# Patient Record
Sex: MALE | Race: WHITE | NOT HISPANIC OR LATINO | Employment: OTHER | URBAN - METROPOLITAN AREA
[De-identification: names, ages, dates, MRNs, and addresses within clinical notes are randomized per-mention and may not be internally consistent; named-entity substitution may affect disease eponyms.]

---

## 2020-08-18 ENCOUNTER — TRANSCRIBE ORDERS (OUTPATIENT)
Dept: ADMINISTRATIVE | Facility: HOSPITAL | Age: 65
End: 2020-08-18

## 2020-08-18 DIAGNOSIS — R94.31 NONSPECIFIC ABNORMAL ELECTROCARDIOGRAM (ECG) (EKG): Primary | ICD-10-CM

## 2020-08-25 ENCOUNTER — HOSPITAL ENCOUNTER (OUTPATIENT)
Dept: NON INVASIVE DIAGNOSTICS | Facility: HOSPITAL | Age: 65
Discharge: HOME | End: 2020-08-25
Payer: COMMERCIAL

## 2020-08-25 DIAGNOSIS — R94.31 NONSPECIFIC ABNORMAL ELECTROCARDIOGRAM (ECG) (EKG): ICD-10-CM

## 2020-08-25 PROCEDURE — 93306 TTE W/DOPPLER COMPLETE: CPT | Performed by: INTERNAL MEDICINE

## 2020-08-25 PROCEDURE — 93306 TTE W/DOPPLER COMPLETE: CPT

## 2020-09-17 ENCOUNTER — OFFICE VISIT (OUTPATIENT)
Dept: BARIATRICS | Facility: CLINIC | Age: 65
End: 2020-09-17
Payer: COMMERCIAL

## 2020-09-17 VITALS
HEIGHT: 73 IN | BODY MASS INDEX: 33.88 KG/M2 | RESPIRATION RATE: 16 BRPM | SYSTOLIC BLOOD PRESSURE: 138 MMHG | WEIGHT: 255.6 LBS | DIASTOLIC BLOOD PRESSURE: 78 MMHG | HEART RATE: 89 BPM | TEMPERATURE: 98.6 F

## 2020-09-17 DIAGNOSIS — E66.9 DIABETES MELLITUS TYPE 2 IN OBESE (HCC): ICD-10-CM

## 2020-09-17 DIAGNOSIS — E11.69 DIABETES MELLITUS TYPE 2 IN OBESE (HCC): ICD-10-CM

## 2020-09-17 DIAGNOSIS — I10 ESSENTIAL HYPERTENSION: ICD-10-CM

## 2020-09-17 DIAGNOSIS — E66.9 CLASS 1 OBESITY: ICD-10-CM

## 2020-09-17 DIAGNOSIS — R63.5 ABNORMAL WEIGHT GAIN: Primary | ICD-10-CM

## 2020-09-17 PROBLEM — R35.1 BENIGN PROSTATIC HYPERPLASIA WITH NOCTURIA: Status: ACTIVE | Noted: 2018-02-07

## 2020-09-17 PROBLEM — N40.1 BENIGN PROSTATIC HYPERPLASIA WITH NOCTURIA: Status: ACTIVE | Noted: 2018-02-07

## 2020-09-17 PROBLEM — E78.5 HYPERLIPIDEMIA: Status: ACTIVE | Noted: 2020-09-17

## 2020-09-17 PROCEDURE — 99204 OFFICE O/P NEW MOD 45 MIN: CPT | Performed by: PHYSICIAN ASSISTANT

## 2020-09-17 RX ORDER — AMLODIPINE BESYLATE 10 MG/1
10 TABLET ORAL DAILY
COMMUNITY
Start: 2020-08-14

## 2020-09-17 RX ORDER — HYDROCHLOROTHIAZIDE 12.5 MG/1
TABLET ORAL
COMMUNITY
Start: 2020-07-27

## 2020-09-17 RX ORDER — LISINOPRIL 10 MG/1
10 TABLET ORAL DAILY
COMMUNITY
Start: 2020-08-19

## 2020-09-17 RX ORDER — ROSUVASTATIN CALCIUM 20 MG/1
20 TABLET, COATED ORAL EVERY EVENING
COMMUNITY
Start: 2020-07-27

## 2020-09-17 NOTE — ASSESSMENT & PLAN NOTE
-Discussed options of HealthyCORE-Intensive Lifestyle Intervention Program, Very Low Calorie Diet-VLCD and Conservative Program and the role of weight loss medications   -Initial weight loss goal of 5-10% weight loss for improved health  -Screening labs: reviewed  -Patient is interested in pursuing VLCD    +STOP BANG (4/8):  -reviewed risks of undiagnosed/untreated sleep apnea  -Recommended referral to sleep medicine provider for further evaluation   -Patient declined  Patient reports he has no interest in talking about KAY and will walk out of office if it is talked about

## 2020-09-17 NOTE — PATIENT INSTRUCTIONS
Goals: Food log (ie ) www myfitnesspal com,sparkpeople  com,loseit com,calorieking  com,etc    No sugary beverages  At least 80oz of water daily    Monitor blood pressure daily to twice a day; if consistently less than 100/60 or feeling lightheaded or dizzy please notify office or family doctor

## 2020-09-17 NOTE — PROGRESS NOTES
Assessment/Plan:    Class 1 obesity  -Discussed options of HealthyCORE-Intensive Lifestyle Intervention Program, Very Low Calorie Diet-VLCD and Conservative Program and the role of weight loss medications   -Initial weight loss goal of 5-10% weight loss for improved health  -Screening labs: reviewed  -Patient is interested in pursuing VLCD    +STOP BANG (4/8):  -reviewed risks of undiagnosed/untreated sleep apnea  -Recommended referral to sleep medicine provider for further evaluation   -Patient declined  Patient reports he has no interest in talking about KAY and will walk out of office if it is talked about  Diabetes mellitus type 2 in obese [facility])    Lab Results   Component Value Date    HGBA1C 6 8 (H) 08/14/2020   -currently managed through diet alone  -will improve with weight loss and dietary changes    Essential hypertension  -On Norvasc, Lisinopril and HCTZ  -likely to improve with weight loss    Hyperlipidemia  -on Crestor  -may improve with weight loss and dietary changes    Goals:    Food log (ie ) www myfitnesspal com,sparkpeople  com,loseit com,calorieking  com,etc    No sugary beverages  At least 80oz of water daily  Monitor blood pressure; if consistently less than 100/60 or feeling lightheaded or dizzy please notify office or family doctor    Follow up in approximately 1 week with Non-Surgical Dietician  Diagnoses and all orders for this visit:    Abnormal weight gain  Comments:  see plan under class 1 obesity    Class 1 obesity    Diabetes mellitus type 2 in obese [facility])    Essential hypertension    Other orders  -     amLODIPine (NORVASC) 10 mg tablet; Take 10 mg by mouth daily  -     hydrochlorothiazide (HYDRODIURIL) 12 5 mg tablet; TAKE 1 TABLET BY MOUTH EVERY DAY  -     lisinopril (ZESTRIL) 10 mg tablet; Take 10 mg by mouth daily  -     rosuvastatin (CRESTOR) 20 MG tablet;  Take 20 mg by mouth every evening          Subjective:   Chief Complaint   Patient presents with    Consult     Patient is here for MWM consult  Taisha Jane 4/8  Patient ID: Paolo Saldivar  is a 63 y o  male with excess weight/obesity here to pursue weight managment  Past Medical History:   Diagnosis Date    Hypertension          HPI:  Obesity/Excess Weight:  Severity: Mild  Onset:  Age 50 , worsened since [facility]  Modifiers: reports no prior attempts  Contributing factors: sweets, grazing  Associated symptoms: increased joint pain and high blood pressure, elevated cholsterol    Goals: 205 lbs  Highest 255 lbs    Hydration: water 1 gallon, 1 can of soda  Exercise: walks 2 miles per day or 5 mile bike ride if didn't walk  Retired    Colonoscopy: reports cologuard 2019    The following portions of the patient's history were reviewed and updated as appropriate: allergies, current medications, past family history, past medical history, past social history, past surgical history and problem list     Review of Systems   Constitutional: Negative for chills and fever  HENT: Negative for sore throat  Respiratory: Negative for cough and shortness of breath  Cardiovascular: Negative for chest pain and palpitations  Gastrointestinal: Negative for abdominal pain, constipation, diarrhea, nausea and vomiting  Genitourinary: Negative for dysuria  Musculoskeletal: Positive for arthralgias (knees and shoulders)  Skin: Negative for rash  Neurological: Negative for dizziness and headaches  Psychiatric/Behavioral: The patient is not nervous/anxious  Denies depression       Objective:    /78 (BP Location: Right arm, Patient Position: Sitting, Cuff Size: Large)   Pulse 89   Temp 98 6 °F (37 °C)   Resp 16   Ht 6' 1" (1 854 m)   Wt 116 kg (255 lb 9 6 oz)   BMI 33 72 kg/m²     Physical Exam  Vitals signs and nursing note reviewed  Constitutional   General appearance: Abnormal   well developed and obese  Eyes No conjunctival pallor     Ears, Nose, Mouth, and Throat Oral mucosa moist    Pulmonary Respiratory effort: No increased work of breathing or signs of respiratory distress  Auscultation of lungs: Clear to auscultation, equal breath sounds bilaterally, no wheezes, no rales, no rhonci  Cardiovascular   Auscultation of heart: Normal rate and rhythm, normal S1 and S2, without murmurs  Examination of extremities for edema and/or varicosities: Normal   no edema  Abdomen   Abdomen: Abnormal   The abdomen was obese  Bowel sounds were normal  The abdomen was soft and nontender     Musculoskeletal   Gait and station: Normal     Psychiatric   Orientation to person, place and time: Normal     Affect: appropriate

## 2020-09-17 NOTE — ASSESSMENT & PLAN NOTE
Lab Results   Component Value Date    HGBA1C 6 8 (H) 08/14/2020   -currently managed through diet alone  -will improve with weight loss and dietary changes

## 2020-09-24 ENCOUNTER — OFFICE VISIT (OUTPATIENT)
Dept: BARIATRICS | Facility: CLINIC | Age: 65
End: 2020-09-24

## 2020-09-24 VITALS — BODY MASS INDEX: 33.77 KG/M2 | TEMPERATURE: 98.7 F | WEIGHT: 254.8 LBS | HEIGHT: 73 IN

## 2020-09-24 DIAGNOSIS — R63.5 ABNORMAL WEIGHT GAIN: ICD-10-CM

## 2020-09-24 PROCEDURE — VLCD

## 2020-09-24 PROCEDURE — RECHECK

## 2020-10-15 ENCOUNTER — OFFICE VISIT (OUTPATIENT)
Dept: BARIATRICS | Facility: CLINIC | Age: 65
End: 2020-10-15

## 2020-10-15 VITALS — BODY MASS INDEX: 32.15 KG/M2 | WEIGHT: 242.6 LBS | HEIGHT: 73 IN | TEMPERATURE: 97.5 F

## 2020-10-15 DIAGNOSIS — R63.5 ABNORMAL WEIGHT GAIN: Primary | ICD-10-CM

## 2020-10-15 PROCEDURE — VLCD

## 2020-10-15 PROCEDURE — RECHECK

## 2020-10-29 ENCOUNTER — OFFICE VISIT (OUTPATIENT)
Dept: BARIATRICS | Facility: CLINIC | Age: 65
End: 2020-10-29

## 2020-10-29 VITALS
SYSTOLIC BLOOD PRESSURE: 126 MMHG | BODY MASS INDEX: 31.73 KG/M2 | TEMPERATURE: 98.1 F | HEIGHT: 73 IN | DIASTOLIC BLOOD PRESSURE: 82 MMHG | WEIGHT: 239.4 LBS

## 2020-10-29 DIAGNOSIS — R63.5 ABNORMAL WEIGHT GAIN: ICD-10-CM

## 2020-10-29 PROCEDURE — VLCD

## 2020-10-29 PROCEDURE — RECHECK

## 2020-11-12 ENCOUNTER — OFFICE VISIT (OUTPATIENT)
Dept: BARIATRICS | Facility: CLINIC | Age: 65
End: 2020-11-12

## 2020-11-12 VITALS
BODY MASS INDEX: 30.77 KG/M2 | TEMPERATURE: 98.3 F | HEIGHT: 73 IN | DIASTOLIC BLOOD PRESSURE: 84 MMHG | SYSTOLIC BLOOD PRESSURE: 126 MMHG | WEIGHT: 232.2 LBS

## 2020-11-12 DIAGNOSIS — R63.5 ABNORMAL WEIGHT GAIN: Primary | ICD-10-CM

## 2020-11-12 PROCEDURE — RECHECK

## 2020-11-12 PROCEDURE — VLCD

## 2020-12-01 ENCOUNTER — LAB (OUTPATIENT)
Dept: LAB | Facility: CLINIC | Age: 65
End: 2020-12-01
Payer: COMMERCIAL

## 2020-12-01 DIAGNOSIS — R63.5 ABNORMAL WEIGHT GAIN: ICD-10-CM

## 2020-12-01 LAB
ANION GAP SERPL CALCULATED.3IONS-SCNC: 5 MMOL/L (ref 4–13)
BUN SERPL-MCNC: 19 MG/DL (ref 5–25)
CALCIUM SERPL-MCNC: 9.8 MG/DL (ref 8.3–10.1)
CHLORIDE SERPL-SCNC: 104 MMOL/L (ref 100–108)
CO2 SERPL-SCNC: 29 MMOL/L (ref 21–32)
CREAT SERPL-MCNC: 1.01 MG/DL (ref 0.6–1.3)
GFR SERPL CREATININE-BSD FRML MDRD: 79 ML/MIN/1.73SQ M
GLUCOSE P FAST SERPL-MCNC: 97 MG/DL (ref 65–99)
MAGNESIUM SERPL-MCNC: 2.6 MG/DL (ref 1.6–2.6)
POTASSIUM SERPL-SCNC: 4.1 MMOL/L (ref 3.5–5.3)
SODIUM SERPL-SCNC: 138 MMOL/L (ref 136–145)

## 2020-12-01 PROCEDURE — 80048 BASIC METABOLIC PNL TOTAL CA: CPT

## 2020-12-01 PROCEDURE — 36415 COLL VENOUS BLD VENIPUNCTURE: CPT

## 2020-12-01 PROCEDURE — 83735 ASSAY OF MAGNESIUM: CPT

## 2020-12-03 ENCOUNTER — OFFICE VISIT (OUTPATIENT)
Dept: BARIATRICS | Facility: CLINIC | Age: 65
End: 2020-12-03

## 2020-12-03 VITALS — BODY MASS INDEX: 30.46 KG/M2 | WEIGHT: 229.8 LBS | HEIGHT: 73 IN

## 2020-12-03 DIAGNOSIS — R63.5 ABNORMAL WEIGHT GAIN: ICD-10-CM

## 2020-12-03 PROCEDURE — RECHECK

## 2020-12-03 PROCEDURE — VLCD

## 2020-12-17 ENCOUNTER — TELEPHONE (OUTPATIENT)
Dept: BARIATRICS | Facility: CLINIC | Age: 65
End: 2020-12-17

## 2021-03-10 DIAGNOSIS — Z23 ENCOUNTER FOR IMMUNIZATION: ICD-10-CM

## 2021-08-06 ENCOUNTER — CONSULT (OUTPATIENT)
Dept: PAIN MEDICINE | Facility: CLINIC | Age: 66
End: 2021-08-06
Payer: COMMERCIAL

## 2021-08-06 VITALS
WEIGHT: 225.8 LBS | DIASTOLIC BLOOD PRESSURE: 90 MMHG | HEIGHT: 73 IN | HEART RATE: 87 BPM | TEMPERATURE: 99.4 F | BODY MASS INDEX: 29.93 KG/M2 | SYSTOLIC BLOOD PRESSURE: 143 MMHG

## 2021-08-06 DIAGNOSIS — M54.12 CERVICAL RADICULOPATHY: Primary | ICD-10-CM

## 2021-08-06 DIAGNOSIS — M54.2 NECK PAIN: ICD-10-CM

## 2021-08-06 DIAGNOSIS — M79.18 MYOFASCIAL PAIN SYNDROME: ICD-10-CM

## 2021-08-06 PROCEDURE — 99244 OFF/OP CNSLTJ NEW/EST MOD 40: CPT | Performed by: ANESTHESIOLOGY

## 2021-08-06 RX ORDER — DULOXETIN HYDROCHLORIDE 20 MG/1
20 CAPSULE, DELAYED RELEASE ORAL DAILY
Qty: 30 CAPSULE | Refills: 1 | Status: SHIPPED | OUTPATIENT
Start: 2021-08-06 | End: 2021-09-20

## 2021-08-06 RX ORDER — TRAMADOL HYDROCHLORIDE 50 MG/1
TABLET ORAL
COMMUNITY
Start: 2021-07-29

## 2021-08-06 NOTE — PATIENT INSTRUCTIONS
Neck Exercises   WHAT YOU NEED TO KNOW:   Why is it important to do neck exercises? Neck exercises help reduce neck pain, and improve neck movement and strength  Neck exercises also help prevent long-term neck problems  What do I need to know about neck exercises? · Do the exercises every day,  or as often as directed by your healthcare provider  · Move slowly, gently, and smoothly  Avoid fast or jerky motions  · Stand and sit the way your healthcare provider shows you  Good posture may reduce your neck pain  Check your posture often, even when you are not doing your neck exercises  How do I perform neck exercises safely? · Exercise position:  You may sit or stand while you do neck exercises  Face forward  Your shoulders should be straight and relaxed, with a good posture  · Head tilts, forward and back:  Gently bow your head and try to touch your chin to your chest  Your healthcare provider may tell you to push on the back of your neck to help bow your head  Raise your chin back to the starting position  Tilt your head back as far as possible so you are looking up at the ceiling  Your healthcare provider may tell you to lift your chin to help tilt your head back  Return your head to the starting position  · Head tilts, side to side:  Tilt your head, bringing your ear toward your shoulder  Then tilt your head toward the other shoulder  · Head turns:  Turn your head to look over your shoulder  Tilt your chin down and try to touch it to your shoulder  Do not raise your shoulder to your chin  Face forward again  Do the same on the other side  · Head rolls:  Slowly bring your chin toward your chest  Next, roll your head to the right  Your ear should be positioned over your shoulder  Hold this position for 5 seconds  Roll your head back toward your chest and to the left into the same position  Hold for 5 seconds   Gently roll your head back and around in a clockwise [place] 3 times  Next, move your head in the reverse direction (counterclockwise) in a [place] 3 times  Do not shrug your shoulders upwards while you do this exercise  When should I contact my healthcare provider? · Your pain does not get better, or gets worse  · You have questions or concerns about your condition, care, or exercise program     CARE AGREEMENT:   You have the right to help plan your care  Learn about your health condition and how it may be treated  Discuss treatment options with your healthcare providers to decide what care you want to receive  You always have the right to refuse treatment  The above information is an  only  It is not intended as medical advice for individual conditions or treatments  Talk to your doctor, nurse or pharmacist before following any medical regimen to see if it is safe and effective for you  © Copyright [employer] 2021 Information is for End User's use only and may not be sold, redistributed or otherwise used for commercial purposes   All illustrations and images included in CareNotes® are the copyrighted property of A D A M , Inc  or [address] [place] [address]

## 2021-08-06 NOTE — PROGRESS NOTES
Assessment:  1  Cervical radiculopathy    2  Neck pain    3  Myofascial pain syndrome        Plan:  Patient is a 73-year-old male complaints of neck pain and right shoulder pain presents office for initial consultation  1  We will consider an x-ray of the cervical spine with flexion extension  2  We will order physical therapy for cervical spine strengthening exercises  Once completed will graduate to [facility] center  3  We will schedule patient for a right ultrasound-guided paracervical and trapezius trigger point injection with steroid  4  We will trial Cymbalta 20 mg p o  q h s  for lumbar radicular symptoms  5  Follow-up in 6 weeks     Complete risks and benefits including bleeding, infection, tissue reaction, nerve injury and allergic reaction were discussed  The approach was demonstrated using models and literature was provided  Verbal and written consent was obtained  History of Present Illness: The patient is a 63 y o  male who presents for consultation in regards to Shoulder Pain and Neck Pain  Symptoms have been present for 1 month  Symptoms began without any precipitating injury or trauma  Pain is reported to be 7 on the numeric rating scale  Symptoms are felt nearly constantly and worst in the no typical pattern  Symptoms are characterized as burning, cramping, sharp, dull/aching, cutting, numbing, tingling, pressure-like and throbbing  Symptoms are associated with right arm weakness  Aggravating factors include lying down and sitting  Relieving factors include nothing  No change in symptoms with kneeling, standing, bending, leaning forward, leaning bckward, walking, relaxation, coughing/sneezing and bowel movements  Patient has not attempted modalities at this time     Medications to relieve symptoms include Tylenol, Motrin, Mobic, Robaxin, gabapentin without any significant alleviation of symptoms      Review of Systems:    Review of Systems   Constitutional: Negative for fever and unexpected weight change  HENT: Negative for trouble swallowing  Eyes: Negative for visual disturbance  Respiratory: Negative for shortness of breath and wheezing  Cardiovascular: Negative for chest pain and palpitations  Gastrointestinal: Negative for constipation, diarrhea, nausea and vomiting  Endocrine: Negative for cold intolerance, heat intolerance and polydipsia  Genitourinary: Negative for difficulty urinating and frequency  Musculoskeletal: Positive for arthralgias, joint swelling and myalgias  Negative for gait problem  Skin: Negative for rash  Neurological: Positive for numbness  Negative for dizziness, seizures, syncope, weakness and headaches  Hematological: Does not bruise/bleed easily  Psychiatric/Behavioral: Negative for dysphoric mood  All other systems reviewed and are negative  Past Medical History:   Diagnosis Date    Hypertension        Past Surgical History:   Procedure Laterality Date    CHOLECYSTECTOMY      SINUS SURGERY         Family History   Problem Relation Age of Onset    Hypertension Father     Heart attack Father     Liver cancer Brother     Diabetes Neg Hx     Stroke Neg Hx        Social History     Occupational History    Not on file   Tobacco Use    Smoking status: Former Smoker    Smokeless tobacco: Never Used    Tobacco comment: Quit 35 years ago     Vaping Use    Vaping Use: Never used   Substance and Sexual Activity    Alcohol use: Not Currently    Drug use: Never    Sexual activity: Not on file         Current Outpatient Medications:     amLODIPine (NORVASC) 10 mg tablet, Take 10 mg by mouth daily, Disp: , Rfl:     hydrochlorothiazide (HYDRODIURIL) 12 5 mg tablet, TAKE 1 TABLET BY MOUTH EVERY DAY, Disp: , Rfl:     lisinopril (ZESTRIL) 10 mg tablet, Take 10 mg by mouth daily, Disp: , Rfl:     rosuvastatin (CRESTOR) 20 MG tablet, Take 20 mg by mouth every evening, Disp: , Rfl:     No Known Allergies    Physical Exam:    /90 (BP Location: Right arm, Patient Position: Sitting, Cuff Size: Standard)   Pulse 87   Temp 99 4 °F (37 4 °C)   Ht 6' 1" (1 854 m)   Wt 102 kg (225 lb 12 8 oz)   BMI 29 79 kg/m²     Constitutional: normal, well developed, well nourished, alert, in no distress and non-toxic and no overt pain behavior  Eyes: anicteric  HEENT: grossly intact  Neck: supple, symmetric, trachea midline and no masses   Pulmonary:even and unlabored  Cardiovascular:No edema or pitting edema present  Skin:Normal without rashes or lesions and well hydrated  Psychiatric:Mood and affect appropriate  Neurologic:Cranial Nerves II-XII grossly intact  Musculoskeletal:normal     Cervical Spine examination demonstrates  Decreased ROM secondary to pain with lateral rotation to the left/right and bending to the left/right, in addition to neck flexion  5/5 upper extremity strength in all muscle groups bilaterally  Negative Spurling's maneuver to the b/l Ue, sensitivity to light touch intact b/l Ue  Imaging  No orders to display       No orders of the defined types were placed in this encounter

## 2021-08-17 ENCOUNTER — EVALUATION (OUTPATIENT)
Dept: PHYSICAL THERAPY | Facility: CLINIC | Age: 66
End: 2021-08-17
Payer: COMMERCIAL

## 2021-08-17 DIAGNOSIS — M54.12 CERVICAL RADICULOPATHY: ICD-10-CM

## 2021-08-17 DIAGNOSIS — M79.18 MYOFASCIAL PAIN SYNDROME: ICD-10-CM

## 2021-08-17 DIAGNOSIS — M54.2 NECK PAIN: ICD-10-CM

## 2021-08-17 PROCEDURE — 97162 PT EVAL MOD COMPLEX 30 MIN: CPT | Performed by: PHYSICAL THERAPIST

## 2021-08-17 PROCEDURE — 97140 MANUAL THERAPY 1/> REGIONS: CPT | Performed by: PHYSICAL THERAPIST

## 2021-08-17 NOTE — PROGRESS NOTES
PT Evaluation      Today's date: 2021  Patient name: Salo Hernandez  : 1955  MRN: [id number]  Referring provider: Flako Frost MD  Dx:   Encounter Diagnosis     ICD-10-CM    1  Cervical radiculopathy  M54 12 Ambulatory referral to Physical Therapy   2  Neck pain  M54 2 Ambulatory referral to Physical Therapy   3  Myofascial pain syndrome  M79 18 Ambulatory referral to Physical Therapy                  Assessment  Assessment details: Salo Hernandez is a 63 y o  male presenting to outpatient physical therapy with diagnosis of cervical pain and radiculopathy  Patient's current impairments include pain, impaired soft tissue mobility, reduced range of motion, reduced strength, reduced postural awareness, and reduced activity tolerance  Patient's present functional limitations include difficulty with ADLs with increased need for assistance, reliance on medication and/or modalities for pain relief, poor tolerance for functional mobility and activity, and difficulty completing work/school responsibilities  Patient to benefit from skilled outpatient physical therapy 2x/week for 4 weeks in order to reduce pain, maximize pain free range of motion, increase strength and stability, and improve functional mobility/functional activity in order to maximize return to prior level of function with reduced limitations  Thank you for your referral     Impairments: abnormal gait, abnormal muscle firing, abnormal or restricted ROM, abnormal movement, activity intolerance, impaired physical strength, lacks appropriate home exercise program, pain with function and poor posture   Understanding of Dx/Px/POC: good   Prognosis: good  Prognosis details: Positive prognostic indicators include positive attitude toward recovery and good understanding of diagnosis and treatment plan options  Negative prognostic indicators include chronicity of symptoms and comorbidities  Goals  STGs to be achieved in 4 weeks:  1   Pt to demonstrate reduced subjective pain rating "at worst" by at least 2-3 points from Initial Eval in order to allow for reduced pain with ADLs and improved functional activity tolerance  2  Pt to demonstrate increased AROM of cervical spine by at least 5-10 degrees in order to allow for greater ease and independence with ADLs and functional mobility  3  Pt to demonstrate full PROM of cervical spine in order to maximize joint mobility and function and allow for progression of exercise program and achievement of goals  4  Pt to demonstrate increased MMT of bilateral UEs by at least 1/2-1 grade in order to improve safety and stability with ADLs and functional mobility  LTGs to be achieved in 6-8 weeks:  1  Pt will be I with HEP in order to continue to improve quality of life and independence and reduce risk for re-injury  2  Pt to demonstrate return to all overhead activity without limitations or restrictions  3  Pt to demonstrate improved function as noted by achieving or exceeding predicted score on FOTO outcomes assessment tool  Plan  Patient would benefit from: skilled physical therapy  Other planned modality interventions: Modalties prn as needed  Planned therapy interventions: manual therapy, neuromuscular re-education, therapeutic exercise, therapeutic activities, gait training, home exercise program and Mukherjee taping  Other planned therapy interventions: [place] Neck  Frequency: 2x week  Duration in visits: 8  Duration in weeks: 4  Plan of Care beginning date: 8/17/2021  Plan of Care expiration date: 9/16/2021  Treatment plan discussed with: patient and PTA        Subjective Evaluation    History of Present Illness  Mechanism of injury: Patient presents with neck pain starting about 6 months ago  Pt thought it was his shoulder, now progressed from shoulder (left) to neck and mid-scap region  Patient reports he has numbness/tingling into left deltoid area to include left scap    He started using a soft neck collar for sleeping  Was started on some meds, seemed to help, but was cx in Epic by PCP accidentally  Reports that gabapentin was helping  Notes recently rehabed his house for the last 6 months  Looking up is bothersome, sidebending pinches, turning head to left results in pain  All of these movements result in instant radicular pains  Sleeping approximately 3 hours solid sleep, otherwise toss and turn  Occasional headaches  Per EMR:    Patient is a 28-year-old male complaints of neck pain and right shoulder pain presents office for initial consultation      1  We will consider an x-ray of the cervical spine with flexion extension  2  We will order physical therapy for cervical spine strengthening exercises  Once completed will graduate to [facility]  3  We will schedule patient for a right ultrasound-guided paracervical and trapezius trigger point injection with steroid  4  We will trial Cymbalta 20 mg p o  q h s  for lumbar radicular symptoms  5  Follow-up in 6 weeks           Not a recurrent problem   Quality of life: fair    Pain  Current pain ratin  At best pain ratin  At worst pain ratin  Location: Cervical spine with radicular symptoms into left UE  Quality: needle-like and burning  Relieving factors: rest, support and medications  Aggravating factors: overhead activity  Progression: worsening    Social Support    Employment status: working  Hand dominance: right    Treatments  Previous treatment: medication  Current treatment: physical therapy  Patient Goals  Patient goals for therapy: decreased pain, increased motion, independence with ADLs/IADLs, increased strength and return to sport/leisure activities          Objective     Concurrent Complaints  Positive for night pain and disturbed sleep   Negative for dizziness, faints, trouble swallowing and visual change    Postural Observations  Seated posture: fair  Standing posture: fair        Tenderness Additional Tenderness Details  TTP through UT  TTP through levator  TTP through anterior scalenes  Restrictions noted in left anterior triangle  Decreased scap mobility left  Hypomobility thoracic spine    Active Range of Motion   Cervical/Thoracic Spine       Cervical    Flexion:  WFL  Extension: 15 degrees      Left lateral flexion: 28 degrees      Right lateral flexion: 35 degrees      Left rotation:  Restriction level: moderate  Right rotation:  Restriction level: moderate    General Comments:      Shoulder Comments   Left deltoid atrophy vs  Right  Left GHJ flexion 4/5  Left GHJ Abd 4/5  Left ER 4-/5  Left IR 4/5  Left Extn 4+/5    Elbow Comments   Grossly 4/5 left; 4+/5 right    Wrist/Hand Comments  Grossly 4+/5 billaterally             Precautions: Radicular symptoms, left GHJ atrophy  Re-eval Date: 9/16/2021    Date 8/17       Visit Count 1       FOTO See IE       Pain In See IE       Pain Out See IE                 Manuals        MObs Grade II-IV thoracic mobs  10 mins  Prone  Pillow abdomen and "C" pillow                               Neuro Re-Ed                                                                Ther Ex        Aerobic        UT stretch        Nods        MTP/LTP        DNF endurance                                Ther Activity                        Gait Training                        Modalities

## 2021-08-18 ENCOUNTER — TELEPHONE (OUTPATIENT)
Dept: PAIN MEDICINE | Facility: CLINIC | Age: 66
End: 2021-08-18

## 2021-08-18 DIAGNOSIS — M54.12 CERVICAL RADICULOPATHY: Primary | ICD-10-CM

## 2021-08-18 RX ORDER — GABAPENTIN 100 MG/1
100 CAPSULE ORAL 3 TIMES DAILY
Qty: 60 CAPSULE | Refills: 1 | Status: SHIPPED | OUTPATIENT
Start: 2021-08-18 | End: 2021-09-20

## 2021-08-18 NOTE — TELEPHONE ENCOUNTER
S/w pt to clarify message below  Pt said RM prescribed Duloxetine but he decided to stop it because it was not helping  RN educated pt that it can take 3-4 weeks to see the effects from the medication, pt said he would like to stay off of it  Pt said he s/w his PCP to refill his Gabapentin, but they told him he will need to have [facility] take over the prescription  Pt said he is taking 100 mg BID with relief of his symptoms and no s/e  Pt requesting a RF to be sent to [facility] on file

## 2021-08-18 NOTE — PROGRESS NOTES
PT Evaluation      Today's date: 2021  Patient name: Demi Richter  : 1955  MRN: [id number]  Referring provider: China Heart MD  Dx:   Encounter Diagnosis     ICD-10-CM    1  Cervical radiculopathy  M54 12 Ambulatory referral to Physical Therapy   2  Neck pain  M54 2 Ambulatory referral to Physical Therapy   3  Myofascial pain syndrome  M79 18 Ambulatory referral to Physical Therapy                  Assessment  Assessment details: Demi Richter is a 63 y o  male presenting to outpatient physical therapy with diagnosis of cervical pain and radiculopathy  Patient's current impairments include pain, impaired soft tissue mobility, reduced range of motion, reduced strength, reduced postural awareness, and reduced activity tolerance  Patient's present functional limitations include difficulty with ADLs with increased need for assistance, reliance on medication and/or modalities for pain relief, poor tolerance for functional mobility and activity, and difficulty completing work/school responsibilities  Patient to benefit from skilled outpatient physical therapy 2x/week for 4 weeks in order to reduce pain, maximize pain free range of motion, increase strength and stability, and improve functional mobility/functional activity in order to maximize return to prior level of function with reduced limitations  Thank you for your referral     Impairments: abnormal gait, abnormal muscle firing, abnormal or restricted ROM, abnormal movement, activity intolerance, impaired physical strength, lacks appropriate home exercise program, pain with function and poor posture   Understanding of Dx/Px/POC: good   Prognosis: good  Prognosis details: Positive prognostic indicators include positive attitude toward recovery and good understanding of diagnosis and treatment plan options  Negative prognostic indicators include chronicity of symptoms and comorbidities  Goals  STGs to be achieved in 4 weeks:  1   Pt to demonstrate reduced subjective pain rating "at worst" by at least 2-3 points from Initial Eval in order to allow for reduced pain with ADLs and improved functional activity tolerance  2  Pt to demonstrate increased AROM of cervical spine by at least 5-10 degrees in order to allow for greater ease and independence with ADLs and functional mobility  3  Pt to demonstrate full PROM of cervical spine in order to maximize joint mobility and function and allow for progression of exercise program and achievement of goals  4  Pt to demonstrate increased MMT of bilateral UEs by at least 1/2-1 grade in order to improve safety and stability with ADLs and functional mobility  LTGs to be achieved in 6-8 weeks:  1  Pt will be I with HEP in order to continue to improve quality of life and independence and reduce risk for re-injury  2  Pt to demonstrate return to all overhead activity without limitations or restrictions  3  Pt to demonstrate improved function as noted by achieving or exceeding predicted score on FOTO outcomes assessment tool  Plan  Patient would benefit from: skilled physical therapy  Other planned modality interventions: Modalties prn as needed  Planned therapy interventions: manual therapy, neuromuscular re-education, therapeutic exercise, therapeutic activities, gait training, home exercise program and Mukherjee taping  Other planned therapy interventions: Iron Neck  Frequency: 2x week  Duration in visits: 8  Duration in weeks: 4  Plan of Care beginning date: 8/17/2021  Plan of Care expiration date: 9/16/2021  Treatment plan discussed with: patient and PTA        Subjective Evaluation    History of Present Illness  Mechanism of injury: Patient presents with neck pain starting about 6 months ago  Pt thought it was his shoulder, now progressed from shoulder (left) to neck and mid-scap region  Patient reports he has numbness/tingling into left deltoid area to include left scap    He started using a soft neck collar for sleeping  Was started on some meds, seemed to help, but was cx in Epic by PCP accidentally  Reports that gabapentin was helping  Notes recently rehabed his house for the last 6 months  Looking up is bothersome, sidebending pinches, turning head to left results in pain  All of these movements result in instant radicular pains  Sleeping approximately 3 hours solid sleep, otherwise toss and turn  Occasional headaches  Per EMR:    Patient is a 61-year-old male complaints of neck pain and right shoulder pain presents office for initial consultation      1  We will consider an x-ray of the cervical spine with flexion extension  2  We will order physical therapy for cervical spine strengthening exercises  Once completed will graduate to [facility]  3  We will schedule patient for a right ultrasound-guided paracervical and trapezius trigger point injection with steroid  4  We will trial Cymbalta 20 mg p o  q h s  for lumbar radicular symptoms  5  Follow-up in 6 weeks           Not a recurrent problem   Quality of life: fair    Pain  Current pain ratin  At best pain ratin  At worst pain ratin  Location: Cervical spine with radicular symptoms into left UE  Quality: needle-like and burning  Relieving factors: rest, support and medications  Aggravating factors: overhead activity  Progression: worsening    Social Support    Employment status: working  Hand dominance: right    Treatments  Previous treatment: medication  Current treatment: physical therapy  Patient Goals  Patient goals for therapy: decreased pain, increased motion, independence with ADLs/IADLs, increased strength and return to sport/leisure activities          Objective     Concurrent Complaints  Positive for night pain and disturbed sleep   Negative for dizziness, faints, headaches, nausea/motion sickness, tinnitus, trouble swallowing, difficulty breathing, visual change, history of cancer, history of trauma and infection    Postural Observations    Additional Postural Observation Details  Left v  Right upper arm atrophy, increased anterior tipping left scap      Tenderness     Additional Tenderness Details  TTP left UT  TTP left levator  Hypomobility of thoracic spine  Increased restrictions left anterior triangle  Decreased left scap mobility  TTP left anterior and medial scalenes  (+) radicular symptoms with left cervical rotation  Increased upper cervical extension at rest, increased kyphosis    Active Range of Motion   Cervical/Thoracic Spine       Cervical    Flexion:  WFL  Extension: 15 degrees      Left lateral flexion: 28 degrees      Right lateral flexion: 35 degrees      Left rotation:  Restriction level: moderate  Right rotation:  Restriction level: moderate    General Comments:      Shoulder Comments   Left deltoid atrophy vs  Right  Left GHJ flexion 4/5  Left GHJ Abd 4/5  Left ER 4-/5  Left IR 4/5  Left Extn 4+/5    Elbow Comments   Grossly 4/5 left; 4+/5 right    Wrist/Hand Comments  Grossly 4+/5 billaterally  Neuro Exam:     Headaches   Patient reports headaches: No               Precautions: Radicular symptoms, left GHJ atrophy  Re-eval Date: 9/16/2021    Date 8/17       Visit Count 1       FOTO See IE       Pain In See IE       Pain Out See IE                 Manuals        MObs Grade II-IV thoracic mobs  10 mins  Prone  Pillow abdomen and "C" pillow                               Neuro Re-Ed                                                                Ther Ex        Aerobic        UT stretch        Nods        MTP/LTP        DNF endurance                                Ther Activity                        Gait Training                        Modalities

## 2021-08-18 NOTE — TELEPHONE ENCOUNTER
Please see note below from PT  Patient is requesting gabapentin  We only saw him for consultation  The only medication that was prescribed was Cymbalta  Keep please call patient and clarify what he is looking for?

## 2021-08-18 NOTE — TELEPHONE ENCOUNTER
----- Message from Jordi Loomis sent at 8/18/2021 10:09 AM EDT -----  Patient was evaluated last night (Tuesday)  He reported Gabapentin was working well, but when he saw his PCP it was accidentally discontinued and the PCP won't re-order it because it was ordered by pain management originally  Not sure what that's all about, but he was wondering if he could get that re-ordered  I told him I would let you know  Thanks and Happy Wednesday!   Audra

## 2021-08-19 ENCOUNTER — OFFICE VISIT (OUTPATIENT)
Dept: PHYSICAL THERAPY | Facility: CLINIC | Age: 66
End: 2021-08-19
Payer: COMMERCIAL

## 2021-08-19 DIAGNOSIS — M79.18 MYOFASCIAL PAIN SYNDROME: ICD-10-CM

## 2021-08-19 DIAGNOSIS — M54.12 CERVICAL RADICULOPATHY: Primary | ICD-10-CM

## 2021-08-19 DIAGNOSIS — M54.2 NECK PAIN: ICD-10-CM

## 2021-08-19 PROCEDURE — 97112 NEUROMUSCULAR REEDUCATION: CPT | Performed by: PHYSICAL MEDICINE & REHABILITATION

## 2021-08-19 PROCEDURE — 97140 MANUAL THERAPY 1/> REGIONS: CPT | Performed by: PHYSICAL MEDICINE & REHABILITATION

## 2021-08-19 PROCEDURE — 97110 THERAPEUTIC EXERCISES: CPT | Performed by: PHYSICAL MEDICINE & REHABILITATION

## 2021-08-19 NOTE — PROGRESS NOTES
Daily Note     Today's date: 2021  Patient name: Jamison Nye  : 1955  MRN: [id number]  Referring provider: Michael Rucker MD  Dx:   Encounter Diagnosis     ICD-10-CM    1  Cervical radiculopathy  M54 12    2  Neck pain  M54 2    3  Myofascial pain syndrome  M79 18                   Subjective: Pt notes no new sx/complaints  C/c of pain L UT region into L shoulder  Intermittent n/t L arm/hand if he turns his neck a certain way  Eager to trial the Iron Neck today  Objective: See treatment diary below      Assessment: Tolerated treatment well  Initiated there ex program without incident this date  Tightness noted L UT/levator scap and suboccipital region  Limited OA flexion with manual tx and SOR; improved after SOR and STM techniques  Noted reduced L UT region pain after manual techniques  Pt with difficulty engaging LT and MTs with postural retraining/nabor scapular program; improves with feedback tactile and verbal; limited by reduced mm endurance and anterior tightness  Initiated Iron neck this date without incident; general mm fatigue noted; denied any increased pain/sx  Required frequent verbal and tactile cues to improve posture and DNF and posterior scapular mm engagement t/o use of iron neck; fair return demo; limited by fatigue  No complaints after tx  Patient demonstrated fatigue post treatment and would benefit from continued PT      Plan: Continue per plan of care  Progress treatment as tolerated         Precautions: Radicular symptoms, left GHJ atrophy  Re-eval Date: 2021    Date       Visit Count 1 2      FOTO See IE       Pain In See IE 7/10      Pain Out See IE 6/10                Manuals        MObs Grade II-IV thoracic mobs  10 mins  Prone  Pillow abdomen and "C" pillow SOR c-spine, gentle manual traction supine c-spine to tolerance, STM suboccipital region, L UT , and B c-spine PVMs   15' total                               Neuro Re-Ed          Iron neck Heavy cord  360* spin cw/ccw  3x ea     Look L /look R 5x 3ea 4 quadrants    Max tactile/verbal cues/feedback for postural awareness, mm activation, motor control/postural control t/o activity                                                       Ther Ex        Aerobic  UBE alt 10'   90 rpm      UT stretch  4x30" L    Levator stretch   4x30" L      Nods  Chin tucks supine  2x10/5"  Cues/feedback       MTP/LTP  Green   20x/5" ea  Cues/feedback      DNF endurance          B ER seated   [place]  Cues/feedback  2x10/5"                      Ther Activity                        Gait Training                        Modalities  deferred

## 2021-08-24 ENCOUNTER — OFFICE VISIT (OUTPATIENT)
Dept: PHYSICAL THERAPY | Facility: CLINIC | Age: 66
End: 2021-08-24
Payer: COMMERCIAL

## 2021-08-24 DIAGNOSIS — M54.12 CERVICAL RADICULOPATHY: Primary | ICD-10-CM

## 2021-08-24 DIAGNOSIS — M54.2 NECK PAIN: ICD-10-CM

## 2021-08-24 DIAGNOSIS — M79.18 MYOFASCIAL PAIN SYNDROME: ICD-10-CM

## 2021-08-24 PROCEDURE — 97110 THERAPEUTIC EXERCISES: CPT

## 2021-08-24 PROCEDURE — 97140 MANUAL THERAPY 1/> REGIONS: CPT

## 2021-08-24 PROCEDURE — 97112 NEUROMUSCULAR REEDUCATION: CPT

## 2021-08-24 NOTE — PROGRESS NOTES
Daily Note     Today's date: 2021  Patient name: Katelynn Buchanan  : 1955  MRN: [id number]  Referring provider: Delilah Rodriguez MD  Dx:   Encounter Diagnosis     ICD-10-CM    1  Cervical radiculopathy  M54 12    2  Neck pain  M54 2    3  Myofascial pain syndrome  M79 18        Start Time: 1415  Stop Time: 1510  Total time in clinic (min): 55 minutes    Subjective: "I am no different from last time I was here  If I turn my head to the L the pain shoots up to 9/10 "      Objective: See treatment diary below      Assessment: Tolerated treatment well  Able to increase reps on strengthening exercises without incident  Palpable tenderness noted L UT  Good tolerance of iron neck; slow controled movements with good posture; no increase in symptoms  No change in pain levels at end of session  Will continue to monitor and progress as able  Patient demonstrated fatigue post treatment and would benefit from continued PT      Plan: Continue per plan of care  Progress treatment as tolerated         Precautions: Radicular symptoms, left GHJ atrophy  Re-eval Date: 2021    Date      Visit Count 1 2 3     FOTO See IE       Pain In See IE 7/10 7/10     Pain Out See IE 6/10 7/10               Manuals       MObs Grade II-IV thoracic mobs  10 mins  Prone  Pillow abdomen and "C" pillow SOR c-spine, gentle manual traction supine c-spine to tolerance, STM suboccipital region, L UT , and B c-spine PVMs   15' total  SOR c-spine, gentle manual traction supine c-spine to tolerance, STM suboccipital region, L UT , and B c-spine PVMs   15' total                              Neuro Re-Ed          Iron neck   Heavy cord  360* spin cw/ccw  3x ea     Look L /look R 5x 3ea 4 quadrants    Max tactile/verbal cues/feedback for postural awareness, mm activation, motor control/postural control t/o activity  Iron neck   Heavy cord  360* spin cw/ccw  3x ea     Look L /look R 10ea 4 quadrants    Max tactile/verbal cues/feedback for postural awareness, mm activation, motor control/postural control t/o activity                                                      Ther Ex        Aerobic  UBE alt 10'   90 rpm UBE alt 10'   90 rpm     UT stretch  4x30" L    Levator stretch   4x30" L 4x30" L    Levator stretch   4x30" L     Nods  Chin tucks supine  2x10/5"  Cues/feedback  Chin tucks supine  2x10/5"  Cues/feedback      MTP/LTP  Green   20x/5" ea  Cues/feedback Green   3x10x/5" ea  Cues/feedback     DNF endurance          B ER seated   [place]  Cues/feedback  2x10/5" B ER seated   [place]  Cues/feedback  3x10/5"                     Ther Activity                        Gait Training                        Modalities  deferred

## 2021-08-26 ENCOUNTER — OFFICE VISIT (OUTPATIENT)
Dept: PHYSICAL THERAPY | Facility: CLINIC | Age: 66
End: 2021-08-26
Payer: COMMERCIAL

## 2021-08-26 DIAGNOSIS — M79.18 MYOFASCIAL PAIN SYNDROME: ICD-10-CM

## 2021-08-26 DIAGNOSIS — M54.12 CERVICAL RADICULOPATHY: Primary | ICD-10-CM

## 2021-08-26 DIAGNOSIS — M54.2 NECK PAIN: ICD-10-CM

## 2021-08-26 PROCEDURE — 97140 MANUAL THERAPY 1/> REGIONS: CPT

## 2021-08-26 PROCEDURE — 97112 NEUROMUSCULAR REEDUCATION: CPT

## 2021-08-26 PROCEDURE — 97110 THERAPEUTIC EXERCISES: CPT

## 2021-08-26 NOTE — PROGRESS NOTES
Daily Note     Today's date: 2021  Patient name: Fahad Perry  : 1955  MRN: [id number]  Referring provider: Haseeb Londono MD  Dx:   Encounter Diagnosis     ICD-10-CM    1  Cervical radiculopathy  M54 12    2  Neck pain  M54 2    3  Myofascial pain syndrome  M79 18        Start Time: 1415  Stop Time: 1500  Total time in clinic (min): 45 minutes    Subjective: "I'm about the same, 6-7/10  It went up higher when I was shaving to tried turning my head "      Objective: See treatment diary below      Assessment: Tolerated treatment well  Trial of GIASTM to L UT with good response  Muscle restriction noted L UT into scap region  Decreased pain and increased mobility noted  Good cervical endurance noted with appropriate fatigue  Will focus on scap region for GIASTM next session and progress as able  Patient demonstrated fatigue post treatment and would benefit from continued PT      Plan: Continue per plan of care  Progress treatment as tolerated  Precautions: Radicular symptoms, left GHJ atrophy  Re-eval Date: 2021    Date     Visit Count 1 2 3 4    FOTO See IE       Pain In See IE 7/10 7/10 6-7/10    Pain Out See IE 6/10 7/10 5/10            GIASTM consent reviewed and signed  GT#4 used with scanning, sweeping, and fanning      Manuals      MObs Grade II-IV thoracic mobs  10 mins  Prone  Pillow abdomen and "C" pillow SOR c-spine, gentle manual traction supine c-spine to tolerance, STM suboccipital region, L UT , and B c-spine PVMs   15' total  SOR c-spine, gentle manual traction supine c-spine to tolerance, STM suboccipital region, L UT , and B c-spine PVMs   15' total  SOR c-spine, gentle manual traction supine c-spine to tolerance, GIASTM to L UT/scap region 15'                            Neuro Re-Ed          Iron neck   Heavy cord  360* spin cw/ccw  3x ea     Look L /look R 5x 3ea 4 quadrants    Max tactile/verbal cues/feedback for postural awareness, mm activation, motor control/postural control t/o activity  Iron neck   Heavy cord  360* spin cw/ccw  3x ea     Look L /look R 10ea 4 quadrants    Max tactile/verbal cues/feedback for postural awareness, mm activation, motor control/postural control t/o activity  Iron neck   Heavy cord  360* spin cw/ccw  3x ea     Look L /look R 10ea 4 quadrants    Max tactile/verbal cues/feedback for postural awareness, mm activation, motor control/postural control t/o activity                                                     Ther Ex        Aerobic  UBE alt 10'   90 rpm UBE alt 10'   90 rpm UBE alt 10'   90 rpm    UT stretch  4x30" L    Levator stretch   4x30" L 4x30" L    Levator stretch   4x30" L 4x30" L    Levator stretch   4x30" L    Nods  Chin tucks supine  2x10/5"  Cues/feedback  Chin tucks supine  2x10/5"  Cues/feedback  Chin tucks supine  2x10/5"  Cues/feedback     MTP/LTP  Green   20x/5" ea  Cues/feedback Green   3x10x/5" ea  Cues/feedback Green   3x10x/5" ea  Cues/feedback    DNF endurance          B ER seated   [place]  Cues/feedback  2x10/5" B ER seated   [place]  Cues/feedback  3x10/5" B ER seated   [place]  Cues/feedback  3x10/5"                    Ther Activity                        Gait Training                        Modalities  deferred

## 2021-08-30 ENCOUNTER — OFFICE VISIT (OUTPATIENT)
Dept: PHYSICAL THERAPY | Facility: CLINIC | Age: 66
End: 2021-08-30
Payer: COMMERCIAL

## 2021-08-30 DIAGNOSIS — M79.18 MYOFASCIAL PAIN SYNDROME: ICD-10-CM

## 2021-08-30 DIAGNOSIS — M54.2 NECK PAIN: ICD-10-CM

## 2021-08-30 DIAGNOSIS — M54.12 CERVICAL RADICULOPATHY: Primary | ICD-10-CM

## 2021-08-30 PROCEDURE — 97110 THERAPEUTIC EXERCISES: CPT

## 2021-08-30 PROCEDURE — 97140 MANUAL THERAPY 1/> REGIONS: CPT

## 2021-08-30 PROCEDURE — 97112 NEUROMUSCULAR REEDUCATION: CPT

## 2021-08-30 NOTE — PROGRESS NOTES
Daily Note     Today's date: 2021  Patient name: Micaela Houser  : 1955  MRN: [id number]  Referring provider: Calvin Harrison MD  Dx:   Encounter Diagnosis     ICD-10-CM    1  Cervical radiculopathy  M54 12    2  Neck pain  M54 2    3  Myofascial pain syndrome  M79 18        Start Time: 0915  Stop Time: 1000  Total time in clinic (min): 45 minutes    Subjective: "I felt good after the GIASTM last time  I think if you go down a little lower on my shoulder blade, it would help "      Objective: See treatment diary below      Assessment: Tolerated treatment well  Pt able to complete all exercises without incident  Focused GIASTM to L scapular region/UT; good response noted  Able to increase reps on iron neck exercises  No change in pain levels at end of session; increased mobility noted  Will continue to monitor and progress as able  Patient demonstrated fatigue post treatment and would benefit from continued PT      Plan: Continue per plan of care  Progress treatment as tolerated  Precautions: Radicular symptoms, left GHJ atrophy  Re-eval Date: 2021    Date    Visit Count 1 2 3 4 5   FOTO See IE       Pain In See IE 7/10 7/10 6-7/10 6/10   Pain Out See IE 6/10 7/10 5/10 6/10           GIASTM consent reviewed and signed  GT#4 used with scanning, sweeping, and fanning      Manuals     MObs Grade II-IV thoracic mobs  10 mins  Prone  Pillow abdomen and "C" pillow SOR c-spine, gentle manual traction supine c-spine to tolerance, STM suboccipital region, L UT , and B c-spine PVMs   15' total  SOR c-spine, gentle manual traction supine c-spine to tolerance, STM suboccipital region, L UT , and B c-spine PVMs   15' total  SOR c-spine, gentle manual traction supine c-spine to tolerance, GIASTM to L UT/scap region 15' SOR c-spine, gentle manual traction supine c-spine to tolerance, GIASTM to L UT/scap region 15'                           Neuro Re-Ed Iron neck   Heavy cord  360* spin cw/ccw  3x ea     Look L /look R 5x 3ea 4 quadrants    Max tactile/verbal cues/feedback for postural awareness, mm activation, motor control/postural control t/o activity  Iron neck   Heavy cord  360* spin cw/ccw  3x ea     Look L /look R 10ea 4 quadrants    Max tactile/verbal cues/feedback for postural awareness, mm activation, motor control/postural control t/o activity  Iron neck   Heavy cord  360* spin cw/ccw  3x ea     Look L /look R 10ea 4 quadrants    Max tactile/verbal cues/feedback for postural awareness, mm activation, motor control/postural control t/o activity  Iron neck   Heavy cord  360* spin cw/ccw  5x ea     Look L /look R 10ea 4 quadrants    Max tactile/verbal cues/feedback for postural awareness, mm activation, motor control/postural control t/o activity                                                    Ther Ex        Aerobic  UBE alt 10'   90 rpm UBE alt 10'   90 rpm UBE alt 10'   90 rpm UBE alt 10'   90 rpm   UT stretch  4x30" L    Levator stretch   4x30" L 4x30" L    Levator stretch   4x30" L 4x30" L    Levator stretch   4x30" L 4x30" L    Levator stretch   4x30" L   Nods  Chin tucks supine  2x10/5"  Cues/feedback  Chin tucks supine  2x10/5"  Cues/feedback  Chin tucks supine  2x10/5"  Cues/feedback  Chin tucks supine  2x10/5"  Cues/feedback    MTP/LTP  Green   20x/5" ea  Cues/feedback Green   3x10x/5" ea  Cues/feedback Green   3x10x/5" ea  Cues/feedback Green   3x10x/5" ea  Cues/feedback   DNF endurance          B ER seated   [place]  Cues/feedback  2x10/5" B ER seated   [place]  Cues/feedback  3x10/5" B ER seated   [place]  Cues/feedback  3x10/5" B ER seated   [place]  Cues/feedback  3x10/5"                   Ther Activity                        Gait Training                        Modalities  deferred

## 2021-09-02 ENCOUNTER — OFFICE VISIT (OUTPATIENT)
Dept: PHYSICAL THERAPY | Facility: CLINIC | Age: 66
End: 2021-09-02
Payer: COMMERCIAL

## 2021-09-02 DIAGNOSIS — M54.12 CERVICAL RADICULOPATHY: Primary | ICD-10-CM

## 2021-09-02 DIAGNOSIS — M54.2 NECK PAIN: ICD-10-CM

## 2021-09-02 DIAGNOSIS — M79.18 MYOFASCIAL PAIN SYNDROME: ICD-10-CM

## 2021-09-02 PROCEDURE — 97110 THERAPEUTIC EXERCISES: CPT

## 2021-09-02 PROCEDURE — 97140 MANUAL THERAPY 1/> REGIONS: CPT

## 2021-09-02 PROCEDURE — 97112 NEUROMUSCULAR REEDUCATION: CPT

## 2021-09-02 NOTE — PROGRESS NOTES
Daily Note     Today's date: 2021  Patient name: Ellsworth Duverney  : 1955  MRN: [id number]  Referring provider: Chuck Roberto MD  Dx:   Encounter Diagnosis     ICD-10-CM    1  Cervical radiculopathy  M54 12    2  Neck pain  M54 2    3  Myofascial pain syndrome  M79 18        Start Time: 0745  Stop Time: 0830  Total time in clinic (min): 45 minutes    Subjective: "I am still getting the pain on the L side of my neck into my shoulder "      Objective: See treatment diary below      Assessment: Tolerated treatment well  Pt able to progress with resistance on various exercise without incident  Added DNF with appropriate challenge  Pt continues with L sided cervical pain despite increased activities  Will continue to progress as able to address deficits  Patient demonstrated fatigue post treatment and would benefit from continued PT      Plan: Continue per plan of care  Progress treatment as tolerated  Precautions: Radicular symptoms, left GHJ atrophy  Re-eval Date: 2021    Date        Visit Count 6       FOTO        Pain In 7/10       Pain Out 6/10               GIASTM consent reviewed and signed  GT#4 used with scanning, sweeping, and fanning      Manuals        MObs GIASTM to L UT/scap region 15'                               Neuro Re-Ed         Iron neck   Heavy cord  360* spin cw/ccw  5x ea     Look L /look R 10ea 4 quadrants    Max tactile/verbal cues/feedback for postural awareness, mm activation, motor control/postural control t/o activity                                                        Ther Ex        Aerobic UBE alt 10'   90 rpm       UT stretch Reviewed  HEP         Nods Chin tucks supine  2x10/5"  Cues/feedback        MTP/LTP Blue  3x10/5"  Cues/feedback       DNF endurance DNF  5x5"        B ER seated   [place]  Cues/feedback  3x10/5"                       Ther Activity                        Gait Training                        Modalities

## 2021-09-07 ENCOUNTER — OFFICE VISIT (OUTPATIENT)
Dept: PHYSICAL THERAPY | Facility: CLINIC | Age: 66
End: 2021-09-07
Payer: COMMERCIAL

## 2021-09-07 DIAGNOSIS — M54.2 NECK PAIN: ICD-10-CM

## 2021-09-07 DIAGNOSIS — M79.18 MYOFASCIAL PAIN SYNDROME: ICD-10-CM

## 2021-09-07 DIAGNOSIS — M54.12 CERVICAL RADICULOPATHY: Primary | ICD-10-CM

## 2021-09-07 PROCEDURE — 97110 THERAPEUTIC EXERCISES: CPT

## 2021-09-07 PROCEDURE — 97112 NEUROMUSCULAR REEDUCATION: CPT

## 2021-09-07 PROCEDURE — 97140 MANUAL THERAPY 1/> REGIONS: CPT

## 2021-09-07 NOTE — PROGRESS NOTES
Daily Note     Today's date: 2021  Patient name: Micaela Houser  : 1955  MRN: [id number]  Referring provider: Calvin Harrison MD  Dx:   Encounter Diagnosis     ICD-10-CM    1  Cervical radiculopathy  M54 12    2  Neck pain  M54 2    3  Myofascial pain syndrome  M79 18        Start Time: 745  Stop Time: 830  Total time in clinic (min): 45 minutes    Subjective: "I am about the same  I have the same pain and I am still getting the N/T down L UE  It doesn't happen as soon as before but I still get it  I still can't sleep on my L side "      Objective: See treatment diary below      Assessment: Tolerated treatment well  Pt able to complete all exercises without increase in symptoms  Able to progress with cervical strengthening exercises  Quick muscle fatigue noted with stabilization cuff and DNF endurance holding  Resumed SOR and MT with good response initially; noted N/T down L UE after session  Patient demonstrated fatigue post treatment and would benefit from continued PT      Plan: Continue per plan of care  Progress treatment as tolerated  Precautions: Radicular symptoms, left GHJ atrophy  Re-eval Date: 2021    Date       Visit Count 6 7/10      FOTO        Pain In 7/10 6-7/10      Pain Out 6/10 6-7/10              GIASTM consent reviewed and signed  GT#4 used with scanning, sweeping, and fanning      Manuals       MObs GIASTM to L UT/scap region 15' SOR, UT stretches, PROM 15'                              Neuro Re-Ed         Iron neck   Heavy cord  360* spin cw/ccw  5x ea     Look L /look R 10ea 4 quadrants    Max tactile/verbal cues/feedback for postural awareness, mm activation, motor control/postural control t/o activity  Iron neck   Heavy cord  360* spin cw/ccw  5x ea     Look L /look R 10ea 4 quadrants    Figure 8's x3 ea    Max tactile/verbal cues/feedback for postural awareness, mm activation, motor control/postural control t/o activity Ther Ex        Aerobic UBE alt 10'   90 rpm UBE alt 10'   90 rpm      UT stretch Reviewed  HEP         Nods Chin tucks supine  2x10/5"  Cues/feedback  Stabilizer  grn  15x10"      MTP/LTP Blue  3x10/5"  Cues/feedback Blue  3x10/5"  Cues/feedback      DNF endurance DNF  5x5" DNF endurance  8x5"       B ER seated   grn  Cues/feedback  3x10/5" B ER seated   grn  Cues/feedback  3x10/5"                      Ther Activity                        Gait Training                        Modalities

## 2021-09-09 ENCOUNTER — OFFICE VISIT (OUTPATIENT)
Dept: PHYSICAL THERAPY | Facility: CLINIC | Age: 66
End: 2021-09-09
Payer: COMMERCIAL

## 2021-09-09 DIAGNOSIS — M54.12 CERVICAL RADICULOPATHY: Primary | ICD-10-CM

## 2021-09-09 DIAGNOSIS — M79.18 MYOFASCIAL PAIN SYNDROME: ICD-10-CM

## 2021-09-09 DIAGNOSIS — M54.2 NECK PAIN: ICD-10-CM

## 2021-09-09 PROCEDURE — 97110 THERAPEUTIC EXERCISES: CPT

## 2021-09-09 PROCEDURE — 97112 NEUROMUSCULAR REEDUCATION: CPT

## 2021-09-09 PROCEDURE — 97140 MANUAL THERAPY 1/> REGIONS: CPT

## 2021-09-09 NOTE — PROGRESS NOTES
Daily Note     Today's date: 2021  Patient name: Cristal Adams  : 1955  MRN: [id number]  Referring provider: Neema Pryor MD  Dx:   Encounter Diagnosis     ICD-10-CM    1  Cervical radiculopathy  M54 12    2  Neck pain  M54 2    3  Myofascial pain syndrome  M79 18        Start Time: 0745  Stop Time: 0845  Total time in clinic (min): 60 minutes  Subjective: "I felt better with the cervical massage and pulling  The pain is a little less today  I am starting to feel the pain in my L shoulder deep in the bone again, where this all started from  It flared up after I used the weed edwin "      Objective: See treatment diary below      Assessment: Tolerated treatment well  Able to increase reps and hold time on cervical endurance without incident  L sided UT restrictions and spasm noted during MT, slight relief noted  Good tolerance and control during cervical strengthening using Iron neck  No significant change in pain levels at end of session  Will continue to progress as able  Patient demonstrated fatigue post treatment and would benefit from continued PT      Plan: Continue per plan of care  Progress treatment as tolerated  Precautions: Radicular symptoms, left GHJ atrophy  Re-eval Date: 2021    Date      Visit Count 6 7/10 8/10     FOTO        Pain In 7/10 6-7/10 6/10     Pain Out 6/10 6-7/10 6/10             GIASTM consent reviewed and signed  GT#4 used with scanning, sweeping, and fanning      Manuals      MObs GIASTM to L UT/scap region 15' SOR, UT stretches, PROM 15' SOR, UT stretches, PROM 15'                             Neuro Re-Ed         Iron neck   Heavy cord  360* spin cw/ccw  5x ea     Look L /look R 10ea 4 quadrants    Max tactile/verbal cues/feedback for postural awareness, mm activation, motor control/postural control t/o activity  Iron neck   Heavy cord  360* spin cw/ccw  5x ea     Look L /look R 10ea 4 quadrants    Figure 8's x3 ea    Max tactile/verbal cues/feedback for postural awareness, mm activation, motor control/postural control t/o activity  Iron neck   Heavy cord  360* spin cw/ccw  5x ea     Look L /look R 10ea 4 quadrants    Figure 8's x3 ea    Max tactile/verbal cues/feedback for postural awareness, mm activation, motor control/postural control t/o activity                                                      Ther Ex        Aerobic UBE alt 10'   90 rpm UBE alt 10'   90 rpm UBE alt 10'   90 rpm     UT stretch Reviewed  HEP         Nods Chin tucks supine  2x10/5"  Cues/feedback  Stabilizer  grn  15x10" Stabilizer  grn  20x10"     MTP/LTP Blue  3x10/5"  Cues/feedback Blue  3x10/5"  Cues/feedback Blue  3x10/5"  Cues/feedback     DNF endurance DNF  5x5" DNF endurance  8x5" DNF endurance  10x5"      B ER seated   grn  Cues/feedback  3x10/5" B ER seated   grn  Cues/feedback  3x10/5" B ER seated   grn  Cues/feedback  3x10/5"                     Ther Activity                        Gait Training                        Modalities

## 2021-09-13 ENCOUNTER — OFFICE VISIT (OUTPATIENT)
Dept: PHYSICAL THERAPY | Facility: CLINIC | Age: 66
End: 2021-09-13
Payer: COMMERCIAL

## 2021-09-13 DIAGNOSIS — M79.18 MYOFASCIAL PAIN SYNDROME: ICD-10-CM

## 2021-09-13 DIAGNOSIS — M54.2 NECK PAIN: ICD-10-CM

## 2021-09-13 DIAGNOSIS — M54.12 CERVICAL RADICULOPATHY: Primary | ICD-10-CM

## 2021-09-13 PROCEDURE — 97110 THERAPEUTIC EXERCISES: CPT

## 2021-09-13 PROCEDURE — 97140 MANUAL THERAPY 1/> REGIONS: CPT

## 2021-09-13 PROCEDURE — 97112 NEUROMUSCULAR REEDUCATION: CPT

## 2021-09-13 NOTE — PROGRESS NOTES
Daily Note     Today's date: 2021  Patient name: Laura Ayers  : 1955  MRN: [id number]  Referring provider: Ananda Mendez MD  Dx:   Encounter Diagnosis     ICD-10-CM    1  Cervical radiculopathy  M54 12    2  Neck pain  M54 2    3  Myofascial pain syndrome  M79 18        Start Time: 0745  Stop Time: 0845  Total time in clinic (min): 60 minutes    Subjective: "I have been having pins and needles down my L arm to mid upper arm all weekend and the pain is the same, 6/10 "      Objective: See treatment diary below      Assessment: Tolerated treatment well  Pt able to complete all exercises without incident  Progressed to increased hold time for DNF endurance with good control  Able to maintain constant pressure and control with cuff stabilizer  No change in symptoms or pain at end of session  Pt RTD 21  Will progress as able  Patient demonstrated fatigue post treatment and would benefit from continued PT      Plan: Continue per plan of care  Progress treatment as tolerated  Precautions: Radicular symptoms, left GHJ atrophy  Re-eval Date: 2021    Date     Visit Count 6 7/10 8/10 9/10    FOTO        Pain In 7/10 6-7/10 6/10 6/10    Pain Out 6/10 6-7/10 6/10             GIASTM consent reviewed and signed  GT#4 used with scanning, sweeping, and fanning      Manuals     MObs GIASTM to L UT/scap region 15' SOR, UT stretches, PROM 15' SOR, UT stretches, PROM 15' SOR, UT stretches, PROM 15'                            Neuro Re-Ed         Iron neck   Heavy cord  360* spin cw/ccw  5x ea     Look L /look R 10ea 4 quadrants    Max tactile/verbal cues/feedback for postural awareness, mm activation, motor control/postural control t/o activity  Iron neck   Heavy cord  360* spin cw/ccw  5x ea     Look L /look R 10ea 4 quadrants    Figure 8's x3 ea    Max tactile/verbal cues/feedback for postural awareness, mm activation, motor control/postural control t/o activity  Iron neck   Heavy cord  360* spin cw/ccw  5x ea     Look L /look R 10ea 4 quadrants    Figure 8's x3 ea    Max tactile/verbal cues/feedback for postural awareness, mm activation, motor control/postural control t/o activity  Iron neck   Heavy cord  360* spin cw/ccw  5x ea     Look L /look R 10ea 4 quadrants    Figure 8's x3 ea    Max tactile/verbal cues/feedback for postural awareness, mm activation, motor control/postural control t/o activity                                                    Ther Ex        Aerobic UBE alt 10'   90 rpm UBE alt 10'   90 rpm UBE alt 10'   90 rpm UBE alt 10'   90 rpm    UT stretch Reviewed  HEP         Nods Chin tucks supine  2x10/5"  Cues/feedback  Stabilizer  grn  15x10" Stabilizer  grn  20x10" Stabilizer  yellow  20x10"    MTP/LTP Blue  3x10/5"  Cues/feedback Blue  3x10/5"  Cues/feedback Blue  3x10/5"  Cues/feedback Blue  3x10/5"  Cues/feedback    DNF endurance DNF  5x5" DNF endurance  8x5" DNF endurance  10x5" DNF endurance  10x5"     B ER seated   grn  Cues/feedback  3x10/5" B ER seated   grn  Cues/feedback  3x10/5" B ER seated   grn  Cues/feedback  3x10/5" B ER seated   grn  Cues/feedback  3x10/5"                    Ther Activity                        Gait Training                        Modalities

## 2021-09-16 ENCOUNTER — APPOINTMENT (OUTPATIENT)
Dept: PHYSICAL THERAPY | Facility: CLINIC | Age: 66
End: 2021-09-16
Payer: COMMERCIAL

## 2021-09-17 ENCOUNTER — EVALUATION (OUTPATIENT)
Dept: PHYSICAL THERAPY | Facility: CLINIC | Age: 66
End: 2021-09-17
Payer: COMMERCIAL

## 2021-09-17 DIAGNOSIS — M54.12 CERVICAL RADICULOPATHY: Primary | ICD-10-CM

## 2021-09-17 DIAGNOSIS — M54.2 NECK PAIN: ICD-10-CM

## 2021-09-17 DIAGNOSIS — M79.18 MYOFASCIAL PAIN SYNDROME: ICD-10-CM

## 2021-09-17 PROCEDURE — 97112 NEUROMUSCULAR REEDUCATION: CPT | Performed by: PHYSICAL MEDICINE & REHABILITATION

## 2021-09-17 PROCEDURE — 97140 MANUAL THERAPY 1/> REGIONS: CPT | Performed by: PHYSICAL MEDICINE & REHABILITATION

## 2021-09-17 PROCEDURE — 97110 THERAPEUTIC EXERCISES: CPT | Performed by: PHYSICAL MEDICINE & REHABILITATION

## 2021-09-17 NOTE — PROGRESS NOTES
PT Re-Evaluation  and PT Discharge     Today's date: 2021  Patient name: Kandi Marie  : 1955  MRN: [id number]  Referring provider: Jesus Ding MD  Dx:   Encounter Diagnosis     ICD-10-CM    1  Cervical radiculopathy  M54 12    2  Neck pain  M54 2    3  Myofascial pain syndrome  M79 18                   Assessment  Assessment details: Update 10/28- Pt has not returned to PT since most recent Re-eval (included in note below- pt's last tx on )  Unable to update pain, ROM, MMT, goals etc as pt never returned to PT for formal f/u or re-eval with PT  Pt will be d/c 2* expiration of POC  Andre Reyes has been compliant with attending PT and completing home exercise program since initial eval and reports overall 50% improvement in pain/sx and function since start of care   Andre Reyes reports and demonstrates decreased pain, increased strength, increased ROM, improved flexibility and improved postural awareness since initial eval, but is still limited compared to prior level of function  He notes reduced intensity of pain and improved strength and mobility of his neck/shoulder  Otherwise, neck/radicular pain/sx are overall unchanged  He demonstrates  Improvements in shoulder and neck strength and motor control with PREs  His AROM remains limited and guarded into extension and L SB> Rotation L with onset of sx  He continues with inability to look up or perform overhead activity for extended periods due to cont'd pain/sx  He continues with hypomobility of c-spine and t-spine and weakness and mm endurance deficits of the DNF and periscapular regions  He RTD Monday  Andre Reyes continues with above listed impairments and would benefit from additional skilled PT to address these deficits to return to prior level of function        Impairments: abnormal muscle firing, abnormal or restricted ROM, abnormal movement, activity intolerance, impaired physical strength, pain with function and poor posture     Symptom irritability: moderateUnderstanding of Dx/Px/POC: good   Prognosis: good  Prognosis details: Positive prognostic indicators include positive attitude toward recovery and good understanding of diagnosis and treatment plan options  Negative prognostic indicators include chronicity of symptoms and comorbidities  Goals  STGs to be achieved in 4 weeks:  1  Pt to demonstrate reduced subjective pain rating "at worst" by at least 2-3 points from Initial Eval in order to allow for reduced pain with ADLs and improved functional activity tolerance  - improved, but not met  2  Pt to demonstrate increased AROM of cervical spine by at least 5-10 degrees in order to allow for greater ease and independence with ADLs and functional mobility  - partially met   3  Pt to demonstrate full PROM of cervical spine in order to maximize joint mobility and function and allow for progression of exercise program and achievement of goals  - not met, improved   4  Pt to demonstrate increased MMT of bilateral UEs by at least 1/2-1 grade in order to improve safety and stability with ADLs and functional mobility  - met L shoulder/UE     LTGs to be achieved in 6-8 weeks:  1  Pt will be I with HEP in order to continue to improve quality of life and independence and reduce risk for re-injury  - progressing   2  Pt to demonstrate return to all overhead activity without limitations or restrictions  - not met   3  Pt to demonstrate improved function as noted by achieving or exceeding predicted score on FOTO outcomes assessment tool  - not met, pt reports subjective improvements and demonstrates objective improvements however FOTO score continues to decline despite pt taking multiple times       Plan  Other planned modality interventions: Modalties prn as needed        Subjective Evaluation    History of Present Illness  Mechanism of injury: Pt notes he RTD this coming Monday  No new sx/complaints overall   Feels he has made about 50% improvement to date  Reports overall intensity of pain and n/t is reduced vs SOC  However, onset, duration, and location are unchanged from [facility]  Cont with pain with looking up extended periods or with quick/jarring movements of head/neck  Sx still radiate to L tricep region  Pain 1* L UT region to lower c-spine  Can radiate into upper back at times  Feels like his neck mobility has improved somewhat and he has better strength of his neck/shoulders with exercises  Pain /sx are not as intense as they were  Otherwise overall his pain/sx are "the same"; no worse  F/u with MD Monday and would like to wait to schedule PT until after MD visit  Not a recurrent problem   Quality of life: fair    Pain  Current pain ratin  At best pain ratin  At worst pain ratin  Location: Cervical spine with radicular symptoms into left UE to L tricep, L UT/MT   Quality: needle-like and burning (tingling )  Relieving factors: rest, support and medications  Aggravating factors: overhead activity  Progression: no change (slight improvement)    Social Support    Employment status: working  Hand dominance: right    Treatments  Previous treatment: medication  Current treatment: physical therapy  Patient Goals  Patient goals for therapy: decreased pain, increased motion, independence with ADLs/IADLs, increased strength and return to sport/leisure activities          Objective     Concurrent Complaints  Positive for night pain and disturbed sleep   Negative for dizziness, faints, trouble swallowing and visual change    Additional Special Questions  Sleeps with neck brace/collar per pt    Postural Observations  Seated posture: fair  Standing posture: fair    Additional Postural Observation Details  Forward head/rounded shoulders  Increased thoracic kyphosis   B scapular depression and protraction with mild winging       Tenderness     Additional Tenderness Details  TTP through UT L , trigger point mid L UT mm ; improved with MT   TTP through levator  TTP through anterior scalenes  Restrictions noted in left anterior triangle- continues   Decreased scap mobility left  Hypomobility thoracic spine- chronic- continues     Neurological Testing     Sensation   Cervical/Thoracic   Left   Intact: light touch    Right   Intact: light touch    Reflexes   Left   Biceps (C5/C6): trace (1+)  Brachioradialis (C6): trace (1+)  Triceps (C7): trace (1+)  Cedeno's reflex: negative    Right   Biceps (C5/C6): trace (1+)  Brachioradialis (C6): trace (1+)  Triceps (C7): trace (1+)  Cedeno's reflex: negative    Active Range of Motion   Cervical/Thoracic Spine       Cervical    Flexion:  WFL  Extension: 15 (L UT pain and n/t into L arm with end range neck ext; brief/resolved at rest ) degrees     with pain  Left lateral flexion: Neck active lateral bend left: ~10-15*, signifigant compensation noted with R rotation with c/o tighntess and pain L UT/shoulder region       Right lateral flexion: 35 degrees      Left rotation:  Restriction level: minimal  Right rotation:  Restriction level: minimal    Additional Active Range of Motion Details  Pain L UT/upper arm with end range extension and L SB > end range L rotation  Improved cervical AROM all planes except extension and L SB      Joint Play     Hypomobile: C3, C4, C5, C6 and C7     General Comments:      Shoulder Comments   Left deltoid atrophy vs  Right  Left GHJ flexion 4-4+/5  Left GHJ Abd 4-4+/5  Left ER 4/5  Left IR 4-4+/5  Left Extn 4+/5    L elbow 5/5  L wrist 5/5     L thumb ext 4/5    UE MMT grossly symmetrical B without pain/sx             Precautions: Radicular symptoms, left GHJ atrophy  Re-eval Date: 9/16/2021    Date 9/2 9/7 9/9 9/13 9/17   Visit Count 6 7/10 8/10 9/10 10/10   FOTO  9/2      Pain In 7/10 6-7/10 6/10 6/10 6/10   Pain Out 6/10 6-7/10 6/10  6/10           GIASTM consent reviewed and signed  GT#4 used with scanning, sweeping, and fanning      Manuals 9/2 9/7 9/9 9/13 9/17   MObs GIASTM to L UT/scap region 15' SOR, UT stretches, PROM 15' SOR, UT stretches, PROM 15' SOR, UT stretches, PROM 15' SOR, UT stretches, PROM 20'                           Neuro Re-Ed         Iron neck   Heavy cord  360* spin cw/ccw  5x ea     Look L /look R 10ea 4 quadrants    Max tactile/verbal cues/feedback for postural awareness, mm activation, motor control/postural control t/o activity  Iron neck   Heavy cord  360* spin cw/ccw  5x ea     Look L /look R 10ea 4 quadrants    Figure 8's x3 ea    Max tactile/verbal cues/feedback for postural awareness, mm activation, motor control/postural control t/o activity  Iron neck   Heavy cord  360* spin cw/ccw  5x ea     Look L /look R 10ea 4 quadrants    Figure 8's x3 ea    Max tactile/verbal cues/feedback for postural awareness, mm activation, motor control/postural control t/o activity  Iron neck   Heavy cord  360* spin cw/ccw  5x ea     Look L /look R 10ea 4 quadrants    Figure 8's x3 ea    Max tactile/verbal cues/feedback for postural awareness, mm activation, motor control/postural control t/o activity Iron neck   Heavy cord  360* spin cw/ccw  5x ea     Look L /look R 10ea 4 quadrants    Figure 8's x3 ea      diagonals L/R 5x hea     Max tactile/verbal cues/feedback for postural awareness, mm activation, motor control/postural control t/o activity                                                   Ther Ex        Aerobic UBE alt 10'   90 rpm UBE alt 10'   90 rpm UBE alt 10'   90 rpm UBE alt 10'   90 rpm UBE alt 10'   60 rpm   UT stretch Reviewed  HEP         Nods Chin tucks supine  2x10/5"  Cues/feedback  Stabilizer  grn  15x10" Stabilizer  grn  20x10" Stabilizer  yellow  20x10" Stabilizer  yellow  20x10"   MTP/LTP Blue  3x10/5"  Cues/feedback Blue  3x10/5"  Cues/feedback Blue  3x10/5"  Cues/feedback Blue  3x10/5"  Cues/feedback Blue  3x10/5"  Cues/feedback   DNF endurance DNF  5x5" DNF endurance  8x5" DNF endurance  10x5" DNF endurance  10x5" DNF endurance  10x10"    B ER seated grn  Cues/feedback  3x10/5" B ER seated   grn  Cues/feedback  3x10/5" B ER seated   grn  Cues/feedback  3x10/5" B ER seated   grn  Cues/feedback  3x10/5" B ER seated   grn  Cues/feedback  3x10/5"                   Ther Activity                        Gait Training                        Modalities

## 2021-09-20 ENCOUNTER — OFFICE VISIT (OUTPATIENT)
Dept: PAIN MEDICINE | Facility: CLINIC | Age: 66
End: 2021-09-20
Payer: COMMERCIAL

## 2021-09-20 VITALS
SYSTOLIC BLOOD PRESSURE: 122 MMHG | WEIGHT: 237 LBS | HEIGHT: 73 IN | BODY MASS INDEX: 31.41 KG/M2 | DIASTOLIC BLOOD PRESSURE: 89 MMHG | HEART RATE: 91 BPM

## 2021-09-20 DIAGNOSIS — M54.2 NECK PAIN: Primary | ICD-10-CM

## 2021-09-20 DIAGNOSIS — M54.12 CERVICAL RADICULOPATHY: ICD-10-CM

## 2021-09-20 DIAGNOSIS — M79.18 MYOFASCIAL PAIN SYNDROME: ICD-10-CM

## 2021-09-20 PROCEDURE — 99214 OFFICE O/P EST MOD 30 MIN: CPT | Performed by: ANESTHESIOLOGY

## 2021-09-20 RX ORDER — GABAPENTIN 300 MG/1
300 CAPSULE ORAL 3 TIMES DAILY
Qty: 60 CAPSULE | Refills: 1 | Status: SHIPPED | OUTPATIENT
Start: 2021-09-20 | End: 2021-11-11

## 2021-09-20 NOTE — PATIENT INSTRUCTIONS
Neck Exercises   WHAT YOU NEED TO KNOW:   Why is it important to do neck exercises? Neck exercises help reduce neck pain, and improve neck movement and strength  Neck exercises also help prevent long-term neck problems  What do I need to know about neck exercises? · Do the exercises every day,  or as often as directed by your healthcare provider  · Move slowly, gently, and smoothly  Avoid fast or jerky motions  · Stand and sit the way your healthcare provider shows you  Good posture may reduce your neck pain  Check your posture often, even when you are not doing your neck exercises  How do I perform neck exercises safely? · Exercise position:  You may sit or stand while you do neck exercises  Face forward  Your shoulders should be straight and relaxed, with a good posture  · Head tilts, forward and back:  Gently bow your head and try to touch your chin to your chest  Your healthcare provider may tell you to push on the back of your neck to help bow your head  Raise your chin back to the starting position  Tilt your head back as far as possible so you are looking up at the ceiling  Your healthcare provider may tell you to lift your chin to help tilt your head back  Return your head to the starting position  · Head tilts, side to side:  Tilt your head, bringing your ear toward your shoulder  Then tilt your head toward the other shoulder  · Head turns:  Turn your head to look over your shoulder  Tilt your chin down and try to touch it to your shoulder  Do not raise your shoulder to your chin  Face forward again  Do the same on the other side  · Head rolls:  Slowly bring your chin toward your chest  Next, roll your head to the right  Your ear should be positioned over your shoulder  Hold this position for 5 seconds  Roll your head back toward your chest and to the left into the same position  Hold for 5 seconds   Gently roll your head back and around in a clockwise [place] 3 times  Next, move your head in the reverse direction (counterclockwise) in a [place] 3 times  Do not shrug your shoulders upwards while you do this exercise  When should I contact my healthcare provider? · Your pain does not get better, or gets worse  · You have questions or concerns about your condition, care, or exercise program     CARE AGREEMENT:   You have the right to help plan your care  Learn about your health condition and how it may be treated  Discuss treatment options with your healthcare providers to decide what care you want to receive  You always have the right to refuse treatment  The above information is an  only  It is not intended as medical advice for individual conditions or treatments  Talk to your doctor, nurse or pharmacist before following any medical regimen to see if it is safe and effective for you  © Copyright [employer] 2021 Information is for End User's use only and may not be sold, redistributed or otherwise used for commercial purposes   All illustrations and images included in CareNotes® are the copyrighted property of [employer]  or [address] [place] [address]

## 2021-09-20 NOTE — PROGRESS NOTES
Assessment:  1  Neck pain    2  Cervical radiculopathy    3  Myofascial pain syndrome        Plan:  Patient is a 74-year-old male complaints of neck pain and left shoulder pain with chronic pain syndrome secondary to cervical radiculopathy, cervical degenerative disc disease presents office for follow-up visit  Patient denies any adverse events last office visit  Patient does report some significant alleviation symptoms with current pain regimen  1  We will titrate gabapentin up to 300 mg p o  t i d   2  Patient will continue home exercise regimen  3  Follow-up in 2 months        History of Present Illness: The patient is a 61 y o  male who presents for a follow up office visit in regards to Neck Pain and Shoulder Pain  The patients current symptoms include 7/10 intermittent pins and needles in neck without any particular time pattern  Current pain medications includes:  Gabapentin 100 mg p o  t i d     The patient reports that this regimen is providing 50% pain relief  The patient is reporting no side effects from this pain medication regimen  I have personally reviewed and/or updated the patient's past medical history, past surgical history, family history, social history, current medications, allergies, and vital signs today  Review of Systems  Review of Systems   Musculoskeletal: Positive for myalgias  Joint stiffness     All other systems reviewed and are negative  Past Medical History:   Diagnosis Date    Hypertension        Past Surgical History:   Procedure Laterality Date    CHOLECYSTECTOMY      SINUS SURGERY         Family History   Problem Relation Age of Onset    Hypertension Father     Heart attack Father     Liver cancer Brother     Diabetes Neg Hx     Stroke Neg Hx        Social History     Occupational History    Not on file   Tobacco Use    Smoking status: Former Smoker    Smokeless tobacco: Never Used    Tobacco comment: Quit 35 years ago     Vaping Use    Vaping Use: Never used   Substance and Sexual Activity    Alcohol use: Not Currently    Drug use: Never    Sexual activity: Not on file         Current Outpatient Medications:     amLODIPine (NORVASC) 10 mg tablet, Take 10 mg by mouth daily, Disp: , Rfl:     DULoxetine (CYMBALTA) 20 mg capsule, Take 1 capsule (20 mg total) by mouth daily, Disp: 30 capsule, Rfl: 1    gabapentin (NEURONTIN) 100 mg capsule, Take 1 capsule (100 mg total) by mouth 3 (three) times a day, Disp: 60 capsule, Rfl: 1    hydrochlorothiazide (HYDRODIURIL) 12 5 mg tablet, TAKE 1 TABLET BY MOUTH EVERY DAY, Disp: , Rfl:     lisinopril (ZESTRIL) 10 mg tablet, Take 10 mg by mouth daily, Disp: , Rfl:     rosuvastatin (CRESTOR) 20 MG tablet, Take 20 mg by mouth every evening, Disp: , Rfl:     traMADol (ULTRAM) 50 mg tablet, Take 1/2 to 1 tab at bedtime for severe pain No driving on med Can cause sedation, Disp: , Rfl:     No Known Allergies    Physical Exam:    /89   Pulse 91   Ht 6' 1" (1 854 m)   Wt 108 kg (237 lb)   BMI 31 27 kg/m²     Constitutional:normal, well developed, well nourished, alert, in no distress and non-toxic and no overt pain behavior  Eyes:anicteric  HEENT:grossly intact  Neck:supple, symmetric, trachea midline and no masses   Pulmonary:even and unlabored  Cardiovascular:No edema or pitting edema present  Skin:Normal without rashes or lesions and well hydrated  Psychiatric:Mood and affect appropriate  Neurologic:Cranial Nerves II-XII grossly intact  Musculoskeletal:normal    Imaging  No orders to display       No orders of the defined types were placed in this encounter

## 2021-11-05 DIAGNOSIS — M54.12 CERVICAL RADICULOPATHY: ICD-10-CM

## 2021-11-11 RX ORDER — GABAPENTIN 300 MG/1
CAPSULE ORAL
Qty: 60 CAPSULE | Refills: 1 | Status: SHIPPED | OUTPATIENT
Start: 2021-11-11